# Patient Record
Sex: FEMALE | Race: WHITE | Employment: FULL TIME | URBAN - METROPOLITAN AREA
[De-identification: names, ages, dates, MRNs, and addresses within clinical notes are randomized per-mention and may not be internally consistent; named-entity substitution may affect disease eponyms.]

---

## 2019-12-10 RX ORDER — HEPARIN SODIUM 5000 [USP'U]/ML
5000 INJECTION, SOLUTION INTRAVENOUS; SUBCUTANEOUS ONCE
Status: CANCELLED | OUTPATIENT
Start: 2019-12-10 | End: 2019-12-10

## 2019-12-10 NOTE — H&P
12/10/2019  Pipe Simpson, 46, F   [facility] Plastic Surgery   Pre-Operative History and Physical  Patient Information  :  Patient Information-  Pedro Stweart   : 1980   Scheduled Services for : Sachin Redman   Appt Purpose: Breast Implant Exchange, Breast Reconstruction - Revision - Right Appt Notes: Surgery 19   Hospital PreOp by Phone   Referral Source: Word of Mouth   Occupation:   Insurance: BC/BS of [employer] Plan: BC/BS of [employer]   Surgery date: 2019 Breast Implant Exchange - Bilateral, Breast Reconstruction - Revision - Right Dr Deepika Light III   Medications / Allergies  :  List any allergies: Allergy Reaction   1 Latex Hives, Rash, Swelling   2 Keflex Hives, Rash, Swelling   3 Durcief Hives, Rash, Swelling   4 Levaquin  Hives, Rash, Swelling   5 Doxycycline  Hives, Rash, Swelling   List any current medications (Please include over-the-counter medications)      Drug Dosage Prescribed by   1. Addreall 20 mg Dr. Daniel Moreira   2. Lorsartan     3. Zytrec 10ml    4. heartburn      5. Zorfan     6. Migrain meds     Non-steroidal anti-inflammatory drugs (ex. Motrin, Aleve)  Patient denies regular use of NSAID's. Do you take vitamins/minerals? No       Height / Weight / BMI:  Height/Weight/BMI  Her height is 5ft 5in and weight is 236lbs. BMI is 39.27. Social / Family History:  ~MUS2 - Smoking Status  Never smoker. Social History  Patient admits alochol use socially, denies using illegal drugs and denies high risk factors.    Family History  Breast Cancer   Diabetes   High Blood Pressure Father   Lung Disease  Vital Signs:  ~MUS2 - Blood Pressure  138/96   Vitals  Pulse 78, Respiration 16, Temperature Not taken and RAPS02 98%   Pre-Operative Diagnosis:  Diagnosis - Text  Diagnosis: deformity of right reconstructed breast   Planned Procedure:  Planned Procedure  Bilateral revision of reconstructed breasts with implant exchange, placement of acellular dermal matrix, elevation of right IMF, and autologous fat grafting   History of Present Illness  history of breast cancer and breast reconstruction. deformity of right breast   Past Medical / Surgical History:  Past Medical History  Breast Cancer <Details>   High Blood Pressure <Details>   Kidney Stones <Details>  Review of Systems (check all current symptoms)  arthritis/joint disformity - no asthma - no chest pain - no heart attack - no high blood pressure - no inflamation of veins - no murmur - no pacemaker - no phlebitis - no convulsions - no epilepsy or seizures - no fainting - no fever or chills - no heart disease - no nausea, vomiting, diarrheah when taking antibiotics - no unexpected weight loss or weight gain - no chronic cough - no hearing loss - no morning cough - no sinus disorder *YES* diabetes - no excessive thirst/hunger - no frequent urination - no thyroid disorder - no abdominal pain *YES* chronic diarrhea or constipation *YES* gastro-intestinal problems - no irregular heartbeat - no stomach absorptive disorder - no ulcers - no bladder problems - no currently breast feeding - no currently pregnant - no frequency/burning during urination - no kidney problems - no yeast infection - no anemia - no bleed easily - no blood clots - no blood transfusion - no arthralgia - no artificial joints - no limited motion in joints - no muscle weakness - no paralysis - no stroke - no numbness or tingling - no headache *YES* migraines *YES* bronchitis *YES* emphysema - no shortness of breath - no tuberculosis - no wheezing - no psychiatric treatment - no recent crisis - no history of psychiatric hospitilization - no   Past Surgeries  Bilatearal mastectomy   Bilateral tissue expander reconstruction 2017   C section   tummy tuck   ankle repair  Anesthesia History  - Nausea and Vomiting.    Diagnosis Codes   :  Diagnosis Codes  Breast   Diagnosis - Breast  History of CA, Personal   Pre-Operative Physical Exam:  HEENT  HEENT: Deferred. Cardiac Exam II  Cardiac Exam: WNL and RRR. Pulmonary Exam  Pulmonary Exam: WNL, Normal inspiratory and expiratory effort and CTA. Abdominal Exam  Abdominal Exam: Soft, NT/ND, There is no evidence of abdominal wall herniation and There is no evidence of umbilical herniation. Extremity & Neuro Exam  Extremity & Neuro Exam: Deferred. Pertinent System/Surgical Site Exam  Bilateral breast within normal limits for surgery   Pre-Operative Checklist / Risks & Benefits:  Pre-op Checklist  1. Patient seen preoperatively. 2. All patient questions answered. 3. Risks and benefits (including alternative treatments) reviewed. 4. Proper prescriptions given. 5. Photographs taken  6. Surgery time and date reviewed.   Pre-Op Risks and Benefits  bleeding, infection, scar, hematoma, seroma, wound breakdown, hypertrophic scar, malposition of implant, implant failure, bruising, swelling, need for additional surgery, less than aesthetic result, numbness, asymmetry, partial skin flap loss, contour irregularities, under or over resection, possible need for drains, recurrence and DVT   Pre-Operative Orders:  Pre-Operative Antibiotic Order Selection  Clindamycin 600MG IV 30 min infusion   Planned Procedure  Bilateral revision of reconstructed breasts with implant exchange, placement of acellular dermal matrix, elevation of right IMF, and autologous fat grafting   Pre-Operative Orders  Nothing by mouth after midnight, SCD's per Anesthesia Guidelines, Pre-Operative Antibiotic Selection, Consent for: and Heparin 5000u on call to Pre-op holding   Chapperone:  Chapperone 2  Soni Vick CST   Signature - H&P - Pre-Op Orders:  Signature (Physician & Date) Pre-Op        Providers  Leafy Germain

## 2019-12-11 ENCOUNTER — HOSPITAL ENCOUNTER (OUTPATIENT)
Dept: SURGERY | Age: 39
Discharge: HOME | End: 2019-12-11

## 2019-12-11 VITALS — WEIGHT: 230 LBS | BODY MASS INDEX: 38.32 KG/M2 | HEIGHT: 65 IN

## 2019-12-11 RX ORDER — LOSARTAN POTASSIUM 50 MG/1
50 TABLET ORAL DAILY
COMMUNITY

## 2019-12-11 RX ORDER — SUMATRIPTAN 100 MG/1
100 TABLET, FILM COATED ORAL
COMMUNITY

## 2019-12-11 RX ORDER — LUBIPROSTONE 24 UG/1
24 CAPSULE, GELATIN COATED ORAL 2 TIMES DAILY WITH MEALS
COMMUNITY

## 2019-12-11 RX ORDER — DEXTROAMPHETAMINE SACCHARATE, AMPHETAMINE ASPARTATE, DEXTROAMPHETAMINE SULFATE AND AMPHETAMINE SULFATE 5; 5; 5; 5 MG/1; MG/1; MG/1; MG/1
20 TABLET ORAL DAILY
COMMUNITY

## 2019-12-11 RX ORDER — OMEPRAZOLE 40 MG/1
40 CAPSULE, DELAYED RELEASE ORAL DAILY
COMMUNITY

## 2019-12-11 RX ORDER — ALBUTEROL SULFATE 90 UG/1
1 AEROSOL, METERED RESPIRATORY (INHALATION)
COMMUNITY

## 2019-12-17 ENCOUNTER — HOSPITAL ENCOUNTER (OUTPATIENT)
Dept: LAB | Age: 39
Discharge: HOME | End: 2019-12-17
Payer: COMMERCIAL

## 2019-12-17 ENCOUNTER — ANESTHESIA EVENT (OUTPATIENT)
Dept: SURGERY | Age: 39
End: 2019-12-17
Payer: COMMERCIAL

## 2019-12-17 LAB — HGB BLD-MCNC: 14.2 G/DL (ref 11.7–15.4)

## 2019-12-17 PROCEDURE — 85018 HEMOGLOBIN: CPT

## 2019-12-17 PROCEDURE — 36415 COLL VENOUS BLD VENIPUNCTURE: CPT

## 2019-12-18 ENCOUNTER — HOSPITAL ENCOUNTER (OUTPATIENT)
Age: 39
Setting detail: OUTPATIENT SURGERY
Discharge: HOME | End: 2019-12-18
Attending: PLASTIC SURGERY | Admitting: PLASTIC SURGERY
Payer: COMMERCIAL

## 2019-12-18 ENCOUNTER — ANESTHESIA (OUTPATIENT)
Dept: SURGERY | Age: 39
End: 2019-12-18
Payer: COMMERCIAL

## 2019-12-18 VITALS
HEART RATE: 86 BPM | RESPIRATION RATE: 16 BRPM | TEMPERATURE: 97.5 F | WEIGHT: 235.7 LBS | BODY MASS INDEX: 39.22 KG/M2 | DIASTOLIC BLOOD PRESSURE: 73 MMHG | SYSTOLIC BLOOD PRESSURE: 124 MMHG | OXYGEN SATURATION: 94 %

## 2019-12-18 PROCEDURE — 74011250636 HC RX REV CODE- 250/636: Performed by: PLASTIC SURGERY

## 2019-12-18 PROCEDURE — 76060000037 HC ANESTHESIA 3 TO 3.5 HR: Performed by: PLASTIC SURGERY

## 2019-12-18 PROCEDURE — 77030008703 HC TU ET UNCUF COVD -A: Performed by: ANESTHESIOLOGY

## 2019-12-18 PROCEDURE — 77030002916 HC SUT ETHLN J&J -A: Performed by: PLASTIC SURGERY

## 2019-12-18 PROCEDURE — 74011000250 HC RX REV CODE- 250: Performed by: PLASTIC SURGERY

## 2019-12-18 PROCEDURE — 77030031139 HC SUT VCRL2 J&J -A: Performed by: PLASTIC SURGERY

## 2019-12-18 PROCEDURE — 77030040361 HC SLV COMPR DVT MDII -B: Performed by: PLASTIC SURGERY

## 2019-12-18 PROCEDURE — 77030008462 HC STPLR SKN PROX J&J -A: Performed by: PLASTIC SURGERY

## 2019-12-18 PROCEDURE — 74011250636 HC RX REV CODE- 250/636: Performed by: ANESTHESIOLOGY

## 2019-12-18 PROCEDURE — C1789 PROSTHESIS, BREAST, IMP: HCPCS | Performed by: PLASTIC SURGERY

## 2019-12-18 PROCEDURE — 77030039425 HC BLD LARYNG TRULITE DISP TELE -A: Performed by: ANESTHESIOLOGY

## 2019-12-18 PROCEDURE — 74011250636 HC RX REV CODE- 250/636: Performed by: NURSE ANESTHETIST, CERTIFIED REGISTERED

## 2019-12-18 PROCEDURE — 74011000250 HC RX REV CODE- 250: Performed by: NURSE ANESTHETIST, CERTIFIED REGISTERED

## 2019-12-18 PROCEDURE — 74011250637 HC RX REV CODE- 250/637: Performed by: ANESTHESIOLOGY

## 2019-12-18 PROCEDURE — 77030002936 HC SUT MERS J&J -A: Performed by: PLASTIC SURGERY

## 2019-12-18 PROCEDURE — 76210000016 HC OR PH I REC 1 TO 1.5 HR: Performed by: PLASTIC SURGERY

## 2019-12-18 PROCEDURE — 77030018836 HC SOL IRR NACL ICUM -A: Performed by: PLASTIC SURGERY

## 2019-12-18 PROCEDURE — 77030027515 HC TBNG LIPO SUC MDCO -B: Performed by: PLASTIC SURGERY

## 2019-12-18 PROCEDURE — 77030020782 HC GWN BAIR PAWS FLX 3M -B: Performed by: ANESTHESIOLOGY

## 2019-12-18 PROCEDURE — 77030011266 HC ELECTRD BLD INSL COVD -A: Performed by: PLASTIC SURGERY

## 2019-12-18 PROCEDURE — 77030008459 HC STPLR SKN COOP -B: Performed by: PLASTIC SURGERY

## 2019-12-18 PROCEDURE — 77030035548 HC DEV TISS COLL DEL SYS REVOLV DISP ALGN -F: Performed by: PLASTIC SURGERY

## 2019-12-18 PROCEDURE — 77030002933 HC SUT MCRYL J&J -A: Performed by: PLASTIC SURGERY

## 2019-12-18 PROCEDURE — 77030037088 HC TUBE ENDOTRACH ORAL NSL COVD-A: Performed by: ANESTHESIOLOGY

## 2019-12-18 PROCEDURE — 76010000173 HC OR TIME 3 TO 3.5 HR INTENSV-TIER 1: Performed by: PLASTIC SURGERY

## 2019-12-18 PROCEDURE — 77030036554: Performed by: PLASTIC SURGERY

## 2019-12-18 PROCEDURE — 76210000021 HC REC RM PH II 0.5 TO 1 HR: Performed by: PLASTIC SURGERY

## 2019-12-18 PROCEDURE — 77030040922 HC BLNKT HYPOTHRM STRY -A: Performed by: ANESTHESIOLOGY

## 2019-12-18 PROCEDURE — 77030011825 HC SUPP SURG PSTOP S2SG -B: Performed by: PLASTIC SURGERY

## 2019-12-18 DEVICE — SMOOTH ROUND HIGH PROFILE GEL-FILLED BREAST IMPLANT, 750CC  SMOOTH ROUND SILICONE
Type: IMPLANTABLE DEVICE | Site: BREAST | Status: FUNCTIONAL
Brand: MENTOR MEMORYGEL BREAST IMPLANT

## 2019-12-18 RX ORDER — ACETAMINOPHEN 10 MG/ML
INJECTION, SOLUTION INTRAVENOUS AS NEEDED
Status: DISCONTINUED | OUTPATIENT
Start: 2019-12-18 | End: 2019-12-18 | Stop reason: HOSPADM

## 2019-12-18 RX ORDER — LIDOCAINE HYDROCHLORIDE 10 MG/ML
INJECTION INFILTRATION; PERINEURAL AS NEEDED
Status: DISCONTINUED | OUTPATIENT
Start: 2019-12-18 | End: 2019-12-18 | Stop reason: HOSPADM

## 2019-12-18 RX ORDER — MIDAZOLAM HYDROCHLORIDE 1 MG/ML
2 INJECTION, SOLUTION INTRAMUSCULAR; INTRAVENOUS
Status: COMPLETED | OUTPATIENT
Start: 2019-12-18 | End: 2019-12-18

## 2019-12-18 RX ORDER — ONDANSETRON 4 MG/1
4 TABLET, ORALLY DISINTEGRATING ORAL ONCE
Status: COMPLETED | OUTPATIENT
Start: 2019-12-18 | End: 2019-12-18

## 2019-12-18 RX ORDER — ACETAMINOPHEN 10 MG/ML
1000 INJECTION, SOLUTION INTRAVENOUS ONCE
Status: DISCONTINUED | OUTPATIENT
Start: 2019-12-18 | End: 2019-12-18 | Stop reason: HOSPADM

## 2019-12-18 RX ORDER — ROCURONIUM BROMIDE 10 MG/ML
INJECTION, SOLUTION INTRAVENOUS AS NEEDED
Status: DISCONTINUED | OUTPATIENT
Start: 2019-12-18 | End: 2019-12-18 | Stop reason: HOSPADM

## 2019-12-18 RX ORDER — LIDOCAINE HYDROCHLORIDE 10 MG/ML
0.1 INJECTION INFILTRATION; PERINEURAL AS NEEDED
Status: DISCONTINUED | OUTPATIENT
Start: 2019-12-18 | End: 2019-12-18 | Stop reason: HOSPADM

## 2019-12-18 RX ORDER — KETAMINE HYDROCHLORIDE 50 MG/ML
INJECTION, SOLUTION INTRAMUSCULAR; INTRAVENOUS AS NEEDED
Status: DISCONTINUED | OUTPATIENT
Start: 2019-12-18 | End: 2019-12-18 | Stop reason: HOSPADM

## 2019-12-18 RX ORDER — BUPIVACAINE HYDROCHLORIDE 2.5 MG/ML
INJECTION, SOLUTION EPIDURAL; INFILTRATION; INTRACAUDAL AS NEEDED
Status: DISCONTINUED | OUTPATIENT
Start: 2019-12-18 | End: 2019-12-18 | Stop reason: HOSPADM

## 2019-12-18 RX ORDER — OXYCODONE HYDROCHLORIDE 5 MG/1
5 TABLET ORAL
Status: DISCONTINUED | OUTPATIENT
Start: 2019-12-18 | End: 2019-12-18 | Stop reason: HOSPADM

## 2019-12-18 RX ORDER — GLYCOPYRROLATE 0.2 MG/ML
INJECTION INTRAMUSCULAR; INTRAVENOUS AS NEEDED
Status: DISCONTINUED | OUTPATIENT
Start: 2019-12-18 | End: 2019-12-18 | Stop reason: HOSPADM

## 2019-12-18 RX ORDER — NALOXONE HYDROCHLORIDE 0.4 MG/ML
0.1 INJECTION, SOLUTION INTRAMUSCULAR; INTRAVENOUS; SUBCUTANEOUS AS NEEDED
Status: DISCONTINUED | OUTPATIENT
Start: 2019-12-18 | End: 2019-12-18 | Stop reason: HOSPADM

## 2019-12-18 RX ORDER — FENTANYL CITRATE 50 UG/ML
INJECTION, SOLUTION INTRAMUSCULAR; INTRAVENOUS AS NEEDED
Status: DISCONTINUED | OUTPATIENT
Start: 2019-12-18 | End: 2019-12-18 | Stop reason: HOSPADM

## 2019-12-18 RX ORDER — SODIUM CHLORIDE 9 MG/ML
INJECTION INTRAMUSCULAR; INTRAVENOUS; SUBCUTANEOUS AS NEEDED
Status: DISCONTINUED | OUTPATIENT
Start: 2019-12-18 | End: 2019-12-18 | Stop reason: HOSPADM

## 2019-12-18 RX ORDER — MIDAZOLAM HYDROCHLORIDE 1 MG/ML
2 INJECTION, SOLUTION INTRAMUSCULAR; INTRAVENOUS ONCE
Status: DISCONTINUED | OUTPATIENT
Start: 2019-12-18 | End: 2019-12-18 | Stop reason: HOSPADM

## 2019-12-18 RX ORDER — HYDROMORPHONE HYDROCHLORIDE 2 MG/ML
0.5 INJECTION, SOLUTION INTRAMUSCULAR; INTRAVENOUS; SUBCUTANEOUS
Status: COMPLETED | OUTPATIENT
Start: 2019-12-18 | End: 2019-12-18

## 2019-12-18 RX ORDER — ALBUTEROL SULFATE 0.83 MG/ML
2.5 SOLUTION RESPIRATORY (INHALATION) AS NEEDED
Status: DISCONTINUED | OUTPATIENT
Start: 2019-12-18 | End: 2019-12-18 | Stop reason: HOSPADM

## 2019-12-18 RX ORDER — PROPOFOL 10 MG/ML
INJECTION, EMULSION INTRAVENOUS AS NEEDED
Status: DISCONTINUED | OUTPATIENT
Start: 2019-12-18 | End: 2019-12-18 | Stop reason: HOSPADM

## 2019-12-18 RX ORDER — ONDANSETRON 2 MG/ML
INJECTION INTRAMUSCULAR; INTRAVENOUS AS NEEDED
Status: DISCONTINUED | OUTPATIENT
Start: 2019-12-18 | End: 2019-12-18 | Stop reason: HOSPADM

## 2019-12-18 RX ORDER — DEXAMETHASONE SODIUM PHOSPHATE 4 MG/ML
INJECTION, SOLUTION INTRA-ARTICULAR; INTRALESIONAL; INTRAMUSCULAR; INTRAVENOUS; SOFT TISSUE AS NEEDED
Status: DISCONTINUED | OUTPATIENT
Start: 2019-12-18 | End: 2019-12-18 | Stop reason: HOSPADM

## 2019-12-18 RX ORDER — NEOSTIGMINE METHYLSULFATE 1 MG/ML
INJECTION, SOLUTION INTRAVENOUS AS NEEDED
Status: DISCONTINUED | OUTPATIENT
Start: 2019-12-18 | End: 2019-12-18 | Stop reason: HOSPADM

## 2019-12-18 RX ORDER — EPINEPHRINE 1 MG/ML
INJECTION, SOLUTION, CONCENTRATE INTRAVENOUS AS NEEDED
Status: DISCONTINUED | OUTPATIENT
Start: 2019-12-18 | End: 2019-12-18 | Stop reason: HOSPADM

## 2019-12-18 RX ORDER — OXYCODONE HYDROCHLORIDE 5 MG/1
10 TABLET ORAL
Status: COMPLETED | OUTPATIENT
Start: 2019-12-18 | End: 2019-12-18

## 2019-12-18 RX ORDER — LIDOCAINE HYDROCHLORIDE 20 MG/ML
INJECTION, SOLUTION EPIDURAL; INFILTRATION; INTRACAUDAL; PERINEURAL AS NEEDED
Status: DISCONTINUED | OUTPATIENT
Start: 2019-12-18 | End: 2019-12-18 | Stop reason: HOSPADM

## 2019-12-18 RX ORDER — HYDROMORPHONE HYDROCHLORIDE 2 MG/ML
0.5 INJECTION, SOLUTION INTRAMUSCULAR; INTRAVENOUS; SUBCUTANEOUS
Status: DISCONTINUED | OUTPATIENT
Start: 2019-12-18 | End: 2019-12-18 | Stop reason: HOSPADM

## 2019-12-18 RX ORDER — ONDANSETRON 2 MG/ML
4 INJECTION INTRAMUSCULAR; INTRAVENOUS ONCE
Status: DISCONTINUED | OUTPATIENT
Start: 2019-12-18 | End: 2019-12-18

## 2019-12-18 RX ORDER — FENTANYL CITRATE 50 UG/ML
100 INJECTION, SOLUTION INTRAMUSCULAR; INTRAVENOUS ONCE
Status: DISCONTINUED | OUTPATIENT
Start: 2019-12-18 | End: 2019-12-18 | Stop reason: HOSPADM

## 2019-12-18 RX ORDER — HEPARIN SODIUM 5000 [USP'U]/ML
5000 INJECTION, SOLUTION INTRAVENOUS; SUBCUTANEOUS ONCE
Status: COMPLETED | OUTPATIENT
Start: 2019-12-18 | End: 2019-12-18

## 2019-12-18 RX ORDER — BACITRACIN 50000 [IU]/1
INJECTION, POWDER, FOR SOLUTION INTRAMUSCULAR AS NEEDED
Status: DISCONTINUED | OUTPATIENT
Start: 2019-12-18 | End: 2019-12-18 | Stop reason: HOSPADM

## 2019-12-18 RX ORDER — CLINDAMYCIN PHOSPHATE 600 MG/50ML
600 INJECTION INTRAVENOUS ONCE
Status: COMPLETED | OUTPATIENT
Start: 2019-12-18 | End: 2019-12-18

## 2019-12-18 RX ORDER — DIPHENHYDRAMINE HYDROCHLORIDE 50 MG/ML
12.5 INJECTION, SOLUTION INTRAMUSCULAR; INTRAVENOUS
Status: DISCONTINUED | OUTPATIENT
Start: 2019-12-18 | End: 2019-12-18 | Stop reason: HOSPADM

## 2019-12-18 RX ORDER — SODIUM CHLORIDE, SODIUM LACTATE, POTASSIUM CHLORIDE, CALCIUM CHLORIDE 600; 310; 30; 20 MG/100ML; MG/100ML; MG/100ML; MG/100ML
100 INJECTION, SOLUTION INTRAVENOUS CONTINUOUS
Status: DISCONTINUED | OUTPATIENT
Start: 2019-12-18 | End: 2019-12-18 | Stop reason: HOSPADM

## 2019-12-18 RX ORDER — BACITRACIN ZINC 500 UNIT/G
OINTMENT (GRAM) TOPICAL AS NEEDED
Status: DISCONTINUED | OUTPATIENT
Start: 2019-12-18 | End: 2019-12-18 | Stop reason: HOSPADM

## 2019-12-18 RX ORDER — HYDROMORPHONE HYDROCHLORIDE 2 MG/ML
INJECTION, SOLUTION INTRAMUSCULAR; INTRAVENOUS; SUBCUTANEOUS AS NEEDED
Status: DISCONTINUED | OUTPATIENT
Start: 2019-12-18 | End: 2019-12-18 | Stop reason: HOSPADM

## 2019-12-18 RX ADMIN — GLYCOPYRROLATE 0.4 MG: 0.2 INJECTION, SOLUTION INTRAMUSCULAR; INTRAVENOUS at 11:53

## 2019-12-18 RX ADMIN — HYDROMORPHONE HYDROCHLORIDE 0.5 MG: 2 INJECTION INTRAMUSCULAR; INTRAVENOUS; SUBCUTANEOUS at 12:46

## 2019-12-18 RX ADMIN — FENTANYL CITRATE 100 MCG: 50 INJECTION INTRAMUSCULAR; INTRAVENOUS at 08:56

## 2019-12-18 RX ADMIN — KETAMINE HYDROCHLORIDE 25 MG: 50 INJECTION INTRAMUSCULAR; INTRAVENOUS at 10:17

## 2019-12-18 RX ADMIN — HYDROMORPHONE HYDROCHLORIDE 0.5 MG: 2 INJECTION INTRAMUSCULAR; INTRAVENOUS; SUBCUTANEOUS at 12:30

## 2019-12-18 RX ADMIN — DEXAMETHASONE SODIUM PHOSPHATE 10 MG: 4 INJECTION, SOLUTION INTRAMUSCULAR; INTRAVENOUS at 09:04

## 2019-12-18 RX ADMIN — LIDOCAINE HYDROCHLORIDE 100 MG: 20 INJECTION, SOLUTION EPIDURAL; INFILTRATION; INTRACAUDAL; PERINEURAL at 08:56

## 2019-12-18 RX ADMIN — Medication 3 MG: at 11:52

## 2019-12-18 RX ADMIN — PROPOFOL 200 MG: 10 INJECTION, EMULSION INTRAVENOUS at 08:56

## 2019-12-18 RX ADMIN — CLINDAMYCIN PHOSPHATE 600 MG: 600 INJECTION, SOLUTION INTRAVENOUS at 08:45

## 2019-12-18 RX ADMIN — ONDANSETRON 4 MG: 4 TABLET, ORALLY DISINTEGRATING ORAL at 14:06

## 2019-12-18 RX ADMIN — HYDROMORPHONE HYDROCHLORIDE 0.5 MG: 2 INJECTION INTRAMUSCULAR; INTRAVENOUS; SUBCUTANEOUS at 12:35

## 2019-12-18 RX ADMIN — ONDANSETRON 4 MG: 2 INJECTION INTRAMUSCULAR; INTRAVENOUS at 09:04

## 2019-12-18 RX ADMIN — HYDROMORPHONE HYDROCHLORIDE 2 MG: 2 INJECTION INTRAMUSCULAR; INTRAVENOUS; SUBCUTANEOUS at 09:07

## 2019-12-18 RX ADMIN — HEPARIN SODIUM 5000 UNITS: 5000 INJECTION INTRAVENOUS; SUBCUTANEOUS at 07:40

## 2019-12-18 RX ADMIN — HYDROMORPHONE HYDROCHLORIDE 0.5 MG: 2 INJECTION INTRAMUSCULAR; INTRAVENOUS; SUBCUTANEOUS at 12:20

## 2019-12-18 RX ADMIN — ROCURONIUM BROMIDE 50 MG: 10 INJECTION, SOLUTION INTRAVENOUS at 08:56

## 2019-12-18 RX ADMIN — HYDROMORPHONE HYDROCHLORIDE 0.5 MG: 2 INJECTION INTRAMUSCULAR; INTRAVENOUS; SUBCUTANEOUS at 12:25

## 2019-12-18 RX ADMIN — KETAMINE HYDROCHLORIDE 25 MG: 50 INJECTION INTRAMUSCULAR; INTRAVENOUS at 11:11

## 2019-12-18 RX ADMIN — KETAMINE HYDROCHLORIDE 50 MG: 50 INJECTION INTRAMUSCULAR; INTRAVENOUS at 09:18

## 2019-12-18 RX ADMIN — MIDAZOLAM 2 MG: 1 INJECTION INTRAMUSCULAR; INTRAVENOUS at 08:37

## 2019-12-18 RX ADMIN — OXYCODONE 10 MG: 5 TABLET ORAL at 13:02

## 2019-12-18 RX ADMIN — SODIUM CHLORIDE, SODIUM LACTATE, POTASSIUM CHLORIDE, AND CALCIUM CHLORIDE 100 ML/HR: 600; 310; 30; 20 INJECTION, SOLUTION INTRAVENOUS at 07:42

## 2019-12-18 RX ADMIN — ACETAMINOPHEN 1000 MG: 10 INJECTION, SOLUTION INTRAVENOUS at 11:13

## 2019-12-18 NOTE — BRIEF OP NOTE
BRIEF OPERATIVE NOTE    Date of Procedure: 12/18/2019   Preoperative Diagnosis: Deformity of reconstructed breast [N65.0]  Disproportion of reconstructed breast [N65.1]  Postoperative Diagnosis: * No post-op diagnosis entered *    Procedure(s):  RIGHT BREAST REVISION OF BREAST RECONSTRUCTION W/ PLACEMENT OF ADM  BILATERAL BREAST IMPLANT EXCHANGE  Surgeon(s) and Role:     * Adelina Hill MD - Primary         Surgical Assistant: Ernestine Jay cst    Surgical Staff:  Scrub Tech-1: Niki Benites  No case tracking events are documented in the log.   Anesthesia: General   Estimated Blood Loss: < 2cc  Specimens: * No specimens in log *   Findings: angioma of left cheek  Complications: none  Implants: * No implants in log *

## 2019-12-18 NOTE — ANESTHESIA POSTPROCEDURE EVALUATION
Procedure(s):  BILATERAL BREAST REVISION OF BREAST RECONSTRUCTION  BILATERAL BREAST IMPLANT EXCHANGE WITH AUTOLOGUS FAT GRAFTING.     general    Anesthesia Post Evaluation      Multimodal analgesia: multimodal analgesia used between 6 hours prior to anesthesia start to PACU discharge  Patient location during evaluation: bedside  Patient participation: complete - patient participated  Level of consciousness: awake  Pain management: adequate  Airway patency: patent  Anesthetic complications: no  Cardiovascular status: acceptable and stable  Respiratory status: acceptable and room air  Hydration status: acceptable        Vitals Value Taken Time   /73 12/18/2019  1:20 PM   Temp 36.4 °C (97.5 °F) 12/18/2019  1:20 PM   Pulse 86 12/18/2019  1:20 PM   Resp 16 12/18/2019  1:20 PM   SpO2 94 % 12/18/2019  1:20 PM

## 2019-12-18 NOTE — BRIEF OP NOTE
BRIEF OPERATIVE NOTE    Date of Procedure: 12/18/2019   Preoperative Diagnosis: Deformity of reconstructed breast [N65.0]  Disproportion of reconstructed breast [N65.1]  Postoperative Diagnosis: Deformity of reconstructed breast [N65.0]  Disproportion of reconstructed breast [N65.1]    Procedure(s):  BILATERAL BREAST REVISION OF BREAST RECONSTRUCTION  BILATERAL BREAST IMPLANT EXCHANGE WITH AUTOLOGUS FAT GRAFTING  Surgeon(s) and Role:     * Suhas Matias MD - Primary         Surgical Assistant: Liberty Raygoza cst    Surgical Staff:  Circ-1: Lori Mukherjee RN  Circ-Relief: Betty Ruiz RN  Scrub Tech-1: Niki Benites  Surg Asst-1: Tashi Santiago  Event Time In Time Out   Incision Start 0915    Incision Close       Anesthesia: General   Estimated Blood Loss: 75 cc  Specimens: * No specimens in log *   Findings: WNL   Complications: none  Implants:   Implant Name Type Inv.  Item Serial No.  Lot No. LRB No. Used Action   IMPL BRST RND HP GEL 750ML --  - [id number]  IMPL BRST RND HP GEL 750ML --  [id number] [employer]  Left 1 Implanted   IMPL BRST RND HP GEL 750ML --  - [id number]  IMPL BRST RND HP GEL 750ML --  [id number] [employer]  Right 1 Implanted

## 2019-12-18 NOTE — ANESTHESIA PREPROCEDURE EVALUATION
Relevant Problems   No relevant active problems       Anesthetic History     PONV   Pertinent negatives: No increased risk of difficult airway       Review of Systems / Medical History  Patient summary reviewed, nursing notes reviewed and pertinent labs reviewed    Pulmonary            Asthma : well controlled       Neuro/Psych         TIA     Cardiovascular    Hypertension              Exercise tolerance: >4 METS     GI/Hepatic/Renal     GERD: well controlled           Endo/Other      Hypothyroidism: well controlled       Other Findings              Physical Exam    Airway  Mallampati: II  TM Distance: 4 - 6 cm  Neck ROM: normal range of motion   Mouth opening: Normal     Cardiovascular  Regular rate and rhythm,  S1 and S2 normal,  no murmur, click, rub, or gallop             Dental  No notable dental hx       Pulmonary  Breath sounds clear to auscultation               Abdominal         Other Findings            Anesthetic Plan    ASA: 3  Anesthesia type: general          Induction: Intravenous  Anesthetic plan and risks discussed with: Patient and Family

## 2019-12-18 NOTE — DISCHARGE INSTRUCTIONS
Keep bra and surgical dressings on until Sunday. May then remove and shower (soaking in tub). Put antibiotic ointment, gauze and surgical bra and abdominal binder back on.   Abdominal binder and bra on at all times except when showering (starting Sunday)  Regular diet  Ambulate every hour while awake  Start po antibiotic tonight and take as directed until gone  Follow up in one week    Instructions Following Ambulatory Surgery    Activity  · As tolerated and directed by your doctor  · Bathe or shower as directed by your doctor    Diet  · Clear liquids until no nausea or vomiting; then light diet for the first day  · Advance to regular diet on second day, unless your doctor orders otherwise  · If nausea and vomiting continues, call your doctor    Pain  · Take pain medication as directed by your doctor  ·  Call your doctor if pain is NOT relieved by medication  · DO NOT take aspirin or blood thinners until directed by your doctor    Follow-Up Phone Calls  · Will be made nursing staff  · If you have any problems, call your doctor as needed    Call your doctor if  · Excessive bleeding that does not stop after holding mild pressure over the area  · Temperature of 101 degrees F or above  · Redness,excessive swelling or bruising, and/or green or yellow, smelly discharge from incision    After Anesthesia  · For the first 24 hours: DO NOT Drive, Drink alcoholic beverages, or Make important decisions  · Be aware of dizziness following anesthesia and while taking pain medication

## 2019-12-20 NOTE — OP NOTES
[address] [address]  OPERATIVE REPORT    Name:  Cate Waterman  MR#:  [id number]  :  1980  ACCOUNT #:  [de-identified]  DATE OF SERVICE:  2019    PREOPERATIVE DIAGNOSIS:  Status post bilateral breast reconstruction with asymmetry and deformity of the reconstructed breast desiring revision. POSTOPERATIVE DIAGNOSIS:  Status post bilateral breast reconstruction with asymmetry and deformity of the reconstructed breast desiring revision. PROCEDURE PERFORMED:  Revision of bilateral breast reconstruction with implant exchange, excision of redundant tissue, implant reposition with capsulorrhaphy and revision of reconstructed breast with autologous fat grafting. SURGEON:  Alexandra Retana MD    ASSISTANT:  Keny Santana CST    ANESTHESIA:  General.    COMPLICATIONS:  none    SPECIMENS REMOVED:  None. IMPLANTS:  none    ESTIMATED BLOOD LOSS:  75 mL. FLUIDS AT OPERATION:  2L of crystalloid. CULTURES:  None. DRAINS:  None. CONDITION AT CLOSE OF PROCEDURE:  Stable    INDICATIONS:  The patient had undergone bilateral mastectomies and immediate reconstruction with tissue expanders and subsequent placement of permanent implants. She had previously undergone several revisions but presented to the office with unhappiness regarding the symmetry of the breast, the projection of the breast, excess lateral axillary fullness and lowering of the right inframammary fold with an obvious bulge of thinning tissue in the lower pole. After reviewing various options to correct this, she has elected to proceed.   She states she understands the risks of the procedure to include, but not be limited to bleeding, infection, scars, asymmetry, poor cosmetic result, loss of part or all of the mastectomy flaps, implant malposition, deflation, migration, extrusion or encapsulation resulting in a firm, painful or distorted breast, hematoma or seroma formation, deep venous thrombosis, pulmonary emboli contour irregularity from the liposuction harvesting such as ripples, bulges or indentations, loss of part or all of the fat grafts, fat emboli, the need for transfusion, the need for hospitalization and the remote risk of death. The patient also understands the implants have a limited life expectancy and will require replacement at some point in the future. Understanding these issues, she wished to proceed. PROCEDURE:  The patient was met in the preoperative area where in the sitting position the midline was marked from the sternal notch to the umbilicus and the inframammary folds were marked bilaterally. A newly proposed inframammary fold for the right side was marked utilizing the left fold as a reference. The areas of excess adiposity in the abdomen were then marked for liposuction for fat harvesting. The patient was then taken to the operating room, placed in the supine position on a well-padded operating room table. Sequential compression devices were placed on her lower extremities. A pillow was placed behind her knees. Her heels were padded and her arms were secured to well-padded arm boards. The patient received 5000 units of heparin subcutaneously and IV antibiotics. After adequate induction of general anesthesia the chest and abdomen were prepped and draped in the usual sterile fashion. Initially the ellipses of excess skin and adipose tissue along the lateral chest wall were excised bilaterally and these wounds were then closed with 2-0 Vicryl in [place] fascia followed by Insorb dermal clips followed by a 4-0 Monocryl subcuticular suture. At this point the abdomen and flanks were infiltrated with 1000 ml of tumescent solution mixed utilizing a prewarmed 1 liter bag of Ringer's lactate with 1 mL of 1:1000 epinephrine and 20 ml of 1% Xylocaine. This fluid was instilled utilizing an infusion needle and a Klein pump.   Attention was then directed to the right breast.  The preexisting vertical incision on the breast was opened and dissection carried down through the subcutaneous tissues to the periprosthetic pocket, which was then opened and an intact implant was removed. The pocket was inspected and there were no significant abnormalities other than an excessively lower inframammary fold and some attenuation of the skin in the lower pole. At this point, a capsulorrhaphy was performed to elevate the inframammary fold utilizing 2 rows of running 3-0 Mersilene dipped in Betadine. The original implant was replaced and this gave a slightly higher fold in the right, but appropriate upper pole fullness and projection. A small amount of lateral capsulorrhaphy was performed as well to improve projection on the contralateral side. The implant was removed. Multiple sizers were placed and the 750 mL smooth round high profile appeared to give better projection and not create excessive tension on the closure. This was 50 mL larger than the 700 mL implant she had in place. With the patient sitting upright, it was noted that the left inframammary fold was still slightly lower than the right at this point and a capsulorrhaphy was again performed with a running 3-0 Mersilene suture. Some lateral tightening of the pocket was performed as well to improve projection. At this point the sizers were removed. The pockets were inspected for hemostasis. With the capsulorrhaphy, the lower pole tissues appeared to be significantly reinforced and I did not feel that AlloDerm was required. The pockets were irrigated with vancomycin solution and Betadine. The skin was reprepped with Betadine. The operative team donned new surgical gloves and the implants were opened and rinsed in vancomycin and Betadine solution and then placed in the preexisting pocket. The incision was then closed with 3-0 Vicryl followed by 4-0 Monocryl. The patient was sat upright and the implants had good symmetry.   At this point, liposuction was initiated with the REVOLVE system. Utilizing a 3 mm cannula fat was harvested from the abdomen and flanks then washed three times as instructed and then loaded into syringes. A total of 200 ml of fat was obtained, 100 mL was placed in each breast from multiple small puncture sites using Starks injection needles. The initial area of correction was in the upper pole where there was some hollowing above the implant, then on both breasts along the anterior surface and medially fat was layered in the  subcutaneous tissues to give a little bit more projection and a smoother softer appearance. The puncture sites were then closed with interrupted 5-0 nylon. Dressings were applied. The incisions were infiltrated with 0.25% Marcaine plain. The patient was then awakened, extubated and transferred to the recovery room in stable condition.         Gamaliel Kwok MD      [id number]  D:  12/19/2019 8:09  T:  12/20/2019 4:45  JOB #:  [id number]  CC:  Naomy Parker MD

## 2023-05-15 ENCOUNTER — APPOINTMENT (RX ONLY)
Dept: URBAN - NONMETROPOLITAN AREA CLINIC 1 | Facility: CLINIC | Age: 43
Setting detail: DERMATOLOGY
End: 2023-05-15

## 2023-05-15 DIAGNOSIS — L01.01 NON-BULLOUS IMPETIGO: ICD-10-CM | Status: INADEQUATELY CONTROLLED

## 2023-05-15 PROCEDURE — ? COUNSELING

## 2023-05-15 PROCEDURE — ? PHOTO-DOCUMENTATION

## 2023-05-15 PROCEDURE — ? PRESCRIPTION

## 2023-05-15 PROCEDURE — 99203 OFFICE O/P NEW LOW 30 MIN: CPT

## 2023-05-15 PROCEDURE — ? PRESCRIPTION MEDICATION MANAGEMENT

## 2023-05-15 RX ORDER — MUPIROCIN 20 MG/G
OINTMENT TOPICAL AS DIRECTED
Qty: 44 | Refills: 1 | Status: ERX | COMMUNITY
Start: 2023-05-15

## 2023-05-15 RX ADMIN — MUPIROCIN: 20 OINTMENT TOPICAL at 00:00

## 2023-05-15 ASSESSMENT — LOCATION ZONE DERM: LOCATION ZONE: NOSE

## 2023-05-15 ASSESSMENT — LOCATION DETAILED DESCRIPTION DERM: LOCATION DETAILED: NASAL SUPRATIP

## 2023-05-15 ASSESSMENT — LOCATION SIMPLE DESCRIPTION DERM: LOCATION SIMPLE: NOSE

## 2023-05-15 NOTE — PROCEDURE: PRESCRIPTION MEDICATION MANAGEMENT
Render In Strict Bullet Format?: No
Detail Level: Zone
Plan: Wash lightly with soap and water, no picking\\nFinish antibiotic from urgent care
Initiate Treatment: mupirocin 2 % topical ointment As directed\\nQuantity: 44.0 g  Days Supply: 30\\nSig: Apply to aa bid until healed

## 2023-06-14 ENCOUNTER — APPOINTMENT (RX ONLY)
Dept: URBAN - NONMETROPOLITAN AREA CLINIC 1 | Facility: CLINIC | Age: 43
Setting detail: DERMATOLOGY
End: 2023-06-14

## 2023-06-14 DIAGNOSIS — L01.01 NON-BULLOUS IMPETIGO: ICD-10-CM | Status: IMPROVED

## 2023-06-14 DIAGNOSIS — L21.8 OTHER SEBORRHEIC DERMATITIS: ICD-10-CM | Status: INADEQUATELY CONTROLLED

## 2023-06-14 PROCEDURE — ? PHOTO-DOCUMENTATION

## 2023-06-14 PROCEDURE — ? COUNSELING

## 2023-06-14 PROCEDURE — ? PRESCRIPTION MEDICATION MANAGEMENT

## 2023-06-14 PROCEDURE — 99214 OFFICE O/P EST MOD 30 MIN: CPT

## 2023-06-14 PROCEDURE — ? PRESCRIPTION

## 2023-06-14 RX ORDER — HYDROCORTISONE 25 MG/G
OINTMENT TOPICAL BID
Qty: 28.35 | Refills: 1 | Status: ERX | COMMUNITY
Start: 2023-06-14

## 2023-06-14 RX ORDER — KETOCONAZOLE 20 MG/G
CREAM TOPICAL AS DIRECTED
Qty: 60 | Refills: 4 | Status: ERX | COMMUNITY
Start: 2023-06-14

## 2023-06-14 RX ADMIN — KETOCONAZOLE: 20 CREAM TOPICAL at 00:00

## 2023-06-14 RX ADMIN — HYDROCORTISONE: 25 OINTMENT TOPICAL at 00:00

## 2023-06-14 ASSESSMENT — LOCATION SIMPLE DESCRIPTION DERM
LOCATION SIMPLE: RIGHT CHEEK
LOCATION SIMPLE: NOSE
LOCATION SIMPLE: LEFT CHEEK

## 2023-06-14 ASSESSMENT — LOCATION ZONE DERM
LOCATION ZONE: FACE
LOCATION ZONE: NOSE

## 2023-06-14 ASSESSMENT — LOCATION DETAILED DESCRIPTION DERM
LOCATION DETAILED: RIGHT INFERIOR MEDIAL MALAR CHEEK
LOCATION DETAILED: LEFT INFERIOR MEDIAL MALAR CHEEK
LOCATION DETAILED: NASAL SUPRATIP

## 2023-06-14 NOTE — PROCEDURE: PRESCRIPTION MEDICATION MANAGEMENT
Render In Strict Bullet Format?: No
Detail Level: Zone
Plan: Wash lightly with soap and water, no picking
Continue Regimen: mupirocin 2 % topical ointment As directed\\nQuantity: 44.0 g  Days Supply: 30\\nSig: Apply to aa bid until healed
Initiate Treatment: ketoconazole 2 % topical cream As directed\\nQuantity: 60.0 g  Days Supply: 15\\nSig: Apply to face mix with hydrocortisone bid\\n\\nhydrocortisone 2.5 % topical ointment BID\\nQuantity: 28.35 g  Days Supply: 30\\nSig: Apply to affected area face 2 times a day for 10 days, then only on weekends.( mix with then Ketoconazole cream)

## 2023-07-12 ENCOUNTER — APPOINTMENT (RX ONLY)
Dept: URBAN - NONMETROPOLITAN AREA CLINIC 1 | Facility: CLINIC | Age: 43
Setting detail: DERMATOLOGY
End: 2023-07-12

## 2023-07-12 DIAGNOSIS — L82.1 OTHER SEBORRHEIC KERATOSIS: ICD-10-CM

## 2023-07-12 DIAGNOSIS — I83.9 ASYMPTOMATIC VARICOSE VEINS OF LOWER EXTREMITIES: ICD-10-CM

## 2023-07-12 DIAGNOSIS — D18.0 HEMANGIOMA: ICD-10-CM

## 2023-07-12 DIAGNOSIS — D22 MELANOCYTIC NEVI: ICD-10-CM

## 2023-07-12 DIAGNOSIS — L81.4 OTHER MELANIN HYPERPIGMENTATION: ICD-10-CM

## 2023-07-12 PROBLEM — D18.01 HEMANGIOMA OF SKIN AND SUBCUTANEOUS TISSUE: Status: ACTIVE | Noted: 2023-07-12

## 2023-07-12 PROBLEM — I83.93 ASYMPTOMATIC VARICOSE VEINS OF BILATERAL LOWER EXTREMITIES: Status: ACTIVE | Noted: 2023-07-12

## 2023-07-12 PROBLEM — D22.5 MELANOCYTIC NEVI OF TRUNK: Status: ACTIVE | Noted: 2023-07-12

## 2023-07-12 PROCEDURE — ? ADDITIONAL NOTES

## 2023-07-12 PROCEDURE — 99213 OFFICE O/P EST LOW 20 MIN: CPT

## 2023-07-12 PROCEDURE — ? FULL BODY SKIN EXAM

## 2023-07-12 PROCEDURE — ? COUNSELING

## 2023-07-12 PROCEDURE — ? SUNSCREEN RECOMMENDATIONS

## 2023-07-12 ASSESSMENT — LOCATION SIMPLE DESCRIPTION DERM
LOCATION SIMPLE: RIGHT UPPER BACK
LOCATION SIMPLE: LEFT PRETIBIAL REGION
LOCATION SIMPLE: RIGHT PRETIBIAL REGION

## 2023-07-12 ASSESSMENT — LOCATION ZONE DERM
LOCATION ZONE: LEG
LOCATION ZONE: TRUNK

## 2023-07-12 ASSESSMENT — LOCATION DETAILED DESCRIPTION DERM
LOCATION DETAILED: RIGHT DISTAL PRETIBIAL REGION
LOCATION DETAILED: RIGHT SUPERIOR UPPER BACK
LOCATION DETAILED: LEFT PROXIMAL PRETIBIAL REGION

## 2023-07-12 NOTE — PROCEDURE: ADDITIONAL NOTES
Render Risk Assessment In Note?: no
Additional Notes: Referring to SANCHEZ Luna in the office for scerlo therapy.
Detail Level: Detailed

## (undated) DEVICE — STAPLER SKIN L39MM DIA0.53MM CRWN 5.7MM S STL FIX HD PROX

## (undated) DEVICE — SUTURE VCRL SZ 2-0 L27IN ABSRB UD L36MM CP-1 1/2 CIR REV J266H

## (undated) DEVICE — SPONGE LAP 18X18IN STRL -- 5/PK

## (undated) DEVICE — REM POLYHESIVE ADULT PATIENT RETURN ELECTRODE: Brand: VALLEYLAB

## (undated) DEVICE — UTILITY MARKER,BLACK WITH LABELS: Brand: DEVON

## (undated) DEVICE — MARKER UTIL W/RULER AND LBL -- STRL

## (undated) DEVICE — SYRINGE CATH TIP 50ML

## (undated) DEVICE — Device: Brand: MEDCO MANUFACTURING INC

## (undated) DEVICE — DRAPE TWL SURG 16X26IN BLU ORB04] ALLCARE INC]

## (undated) DEVICE — INTENDED FOR TISSUE SEPARATION, AND OTHER PROCEDURES THAT REQUIRE A SHARP SURGICAL BLADE TO PUNCTURE OR CUT.: Brand: BARD-PARKER SAFETY BLADES SIZE 15, STERILE

## (undated) DEVICE — DRESSING,GAUZE,XEROFORM,CURAD,5"X9",ST: Brand: CURAD

## (undated) DEVICE — TRAY PREP DRY W/ PREM GLV 2 APPL 6 SPNG 2 UNDPD 1 OVERWRAP

## (undated) DEVICE — TIP CLEANR ELECSURG 1.75X1.75 --

## (undated) DEVICE — HIGH PROFILE, HP 750CC GEL SIZER: Brand: MENTOR MEMORYGEL RESTERILIZABLE GEL SIZER

## (undated) DEVICE — SUTURE NONABSORBABLE MONOFILAMENT 5-0 PS-2 18 IN BLK ETHILON 1666H

## (undated) DEVICE — SUT MERS 3-0 18IN FS1 WHT --

## (undated) DEVICE — GAUZE,SPONGE,4"X4",16PLY,STRL,LF,10/TRAY: Brand: MEDLINE

## (undated) DEVICE — BANDAGE,GAUZE,BULKEE II,4.5"X4.1YD,STRL: Brand: MEDLINE

## (undated) DEVICE — PAD,ABDOMINAL,5"X9",ST,LF,25/BX: Brand: MEDLINE INDUSTRIES, INC.

## (undated) DEVICE — NEEDLE HYPO 18GA L1.5IN PNK S STL HUB POLYPR SHLD REG BVL

## (undated) DEVICE — SYR 10ML LUER LOK 1/5ML GRAD --

## (undated) DEVICE — BINDER ABD M/L H12IN FOR 46-62IN WHT 4 SLD PNL DSGN HOOP

## (undated) DEVICE — SUTURE ABSRB X-1 REV CUT 1/2 CIR 22MM UD BRAID 27IN SZ 3-0 J458H

## (undated) DEVICE — STAPLER SKIN SQ 30 ABSRB STPL DISP INSORB

## (undated) DEVICE — SOLUTION IV 1000ML 0.9% SOD CHL

## (undated) DEVICE — SUTURE MCRYL SZ 4-0 L27IN ABSRB UD L19MM PS-2 1/2 CIR PRIM Y426H

## (undated) DEVICE — GOWN,REINF,POLY,ECL,PP SLV,XL: Brand: MEDLINE

## (undated) DEVICE — DRAPE,TOP,102X53,STERILE: Brand: MEDLINE

## (undated) DEVICE — BRA SURG 3XL FOR 52-55IN CHST LYCRA FR HK LOOP CLSR FOR

## (undated) DEVICE — GARMENT,MEDLINE,DVT,INT,CALF,MED, GEN2: Brand: MEDLINE

## (undated) DEVICE — BLADE ELECTRODE: Brand: VALLEYLAB

## (undated) DEVICE — (D)DEVICE COLL TISS DEL SYS -- DISC BY MFR USE ITEM 336430

## (undated) DEVICE — 2000CC GUARDIAN II: Brand: GUARDIAN

## (undated) DEVICE — MINOR SPLIT GENERAL: Brand: MEDLINE INDUSTRIES, INC.